# Patient Record
Sex: FEMALE | URBAN - METROPOLITAN AREA
[De-identification: names, ages, dates, MRNs, and addresses within clinical notes are randomized per-mention and may not be internally consistent; named-entity substitution may affect disease eponyms.]

---

## 2019-04-15 ENCOUNTER — IMPORTED ENCOUNTER (OUTPATIENT)
Dept: URBAN - METROPOLITAN AREA CLINIC 9 | Facility: CLINIC | Age: 69
End: 2019-04-15

## 2021-10-16 ASSESSMENT — VISUAL ACUITY
OD_CC: 20/50 -2 SN
OS_CC: 20/50 -2 SN

## 2021-10-16 ASSESSMENT — TONOMETRY
OS_IOP_MMHG: 22
OD_IOP_MMHG: 21

## 2021-10-16 ASSESSMENT — PACHYMETRY
OS_CT_UM: 542.0
OD_CT_UM: 554.0

## 2022-04-07 NOTE — PATIENT DISCUSSION
vf remains clear OU, and will follow for now, disc poss cat sx in the future as a means to lower iop and risk.